# Patient Record
Sex: MALE | Race: WHITE | NOT HISPANIC OR LATINO | Employment: OTHER | URBAN - NONMETROPOLITAN AREA
[De-identification: names, ages, dates, MRNs, and addresses within clinical notes are randomized per-mention and may not be internally consistent; named-entity substitution may affect disease eponyms.]

---

## 2017-12-13 ENCOUNTER — OFFICE VISIT (OUTPATIENT)
Dept: CARDIOLOGY | Facility: CLINIC | Age: 67
End: 2017-12-13

## 2017-12-13 VITALS
DIASTOLIC BLOOD PRESSURE: 72 MMHG | HEART RATE: 88 BPM | SYSTOLIC BLOOD PRESSURE: 112 MMHG | OXYGEN SATURATION: 98 % | HEIGHT: 69 IN | BODY MASS INDEX: 36.29 KG/M2 | WEIGHT: 245 LBS

## 2017-12-13 DIAGNOSIS — R06.09 DYSPNEA ON EXERTION: ICD-10-CM

## 2017-12-13 DIAGNOSIS — E66.09 CLASS 2 OBESITY DUE TO EXCESS CALORIES WITHOUT SERIOUS COMORBIDITY WITH BODY MASS INDEX (BMI) OF 36.0 TO 36.9 IN ADULT: ICD-10-CM

## 2017-12-13 DIAGNOSIS — R94.31 ABNORMAL EKG: Primary | ICD-10-CM

## 2017-12-13 DIAGNOSIS — E78.5 DYSLIPIDEMIA: ICD-10-CM

## 2017-12-13 PROBLEM — E11.9 TYPE II DIABETES MELLITUS (HCC): Status: ACTIVE | Noted: 2017-12-13

## 2017-12-13 PROCEDURE — 93000 ELECTROCARDIOGRAM COMPLETE: CPT | Performed by: INTERNAL MEDICINE

## 2017-12-13 PROCEDURE — 99204 OFFICE O/P NEW MOD 45 MIN: CPT | Performed by: INTERNAL MEDICINE

## 2017-12-13 RX ORDER — DOCUSATE CALCIUM 240 MG
240 CAPSULE ORAL 2 TIMES DAILY
COMMUNITY

## 2017-12-13 RX ORDER — NAPROXEN SODIUM 220 MG
220 TABLET ORAL 2 TIMES DAILY PRN
COMMUNITY

## 2017-12-13 NOTE — PROGRESS NOTES
Reason for Visit: Abnormal EKG.    HPI:  Darren Salinas is a 67 y.o. male is being seen for consultation today at the request of Jess Rice.  He had an EKG done as part of a routine screening.  This was abnormal and he was referred to cardiology for further evaluation.  He notes some shortness of breath with exertion.  He denies any chest pain, palpitations, dizziness, syncope, PND, or orthopnea.      Previous Cardiac Testing and Procedures:  - EKG (10/12/2017) normal sinus rhythm with right bundle-branch block, left posterior fascicular block    Patient Active Problem List   Diagnosis   • Dyslipidemia   • Type II diabetes mellitus   • Class 2 obesity due to excess calories with body mass index (BMI) of 36.0 to 36.9 in adult       Social History   Substance Use Topics   • Smoking status: Never Smoker   • Smokeless tobacco: Never Used   • Alcohol use Yes      Comment: OCC        Family History   Problem Relation Age of Onset   • Heart disease Father        The following portions of the patient's history were reviewed and updated as appropriate: allergies, current medications, past family history, past medical history, past social history, past surgical history and problem list.      Current Outpatient Prescriptions:   •  aspirin 81 MG EC tablet, Take 81 mg by mouth Daily., Disp: , Rfl:   •  Canagliflozin (INVOKANA) 300 MG tablet, Take 300 mg by mouth., Disp: , Rfl:   •  docusate calcium (SURFAK) 240 MG capsule, Take 240 mg by mouth 2 (Two) Times a Day., Disp: , Rfl:   •  fenofibrate 160 MG tablet, Take 160 mg by mouth Daily., Disp: , Rfl:   •  metFORMIN (GLUCOPHAGE) 500 MG tablet, Take 500 mg by mouth 2 (Two) Times a Day With Meals., Disp: , Rfl:   •  naproxen sodium (ALEVE) 220 MG tablet, Take 220 mg by mouth 2 (Two) Times a Day As Needed., Disp: , Rfl:   •  rosuvastatin (CRESTOR) 20 MG tablet, Take 20 mg by mouth Daily., Disp: , Rfl:     Review of Systems   Constitution: Negative for chills, fever  "and weight loss.   HENT: Negative for sore throat.    Eyes: Negative for blurred vision and visual disturbance.   Cardiovascular: Negative for chest pain, dyspnea on exertion, leg swelling, palpitations, paroxysmal nocturnal dyspnea and syncope.   Respiratory: Negative for cough and shortness of breath.    Endocrine: Negative for cold intolerance and polyuria.   Skin: Negative for itching and rash.   Musculoskeletal: Negative for joint swelling and myalgias.   Gastrointestinal: Negative for abdominal pain, diarrhea, heartburn and vomiting.   Genitourinary: Negative for dysuria and hematuria.   Neurological: Negative for dizziness, headaches and numbness.   Psychiatric/Behavioral: Negative for depression. The patient is not nervous/anxious.    Allergic/Immunologic: Negative for hives.       Objective   /72 (BP Location: Left arm, Patient Position: Sitting, Cuff Size: Adult)  Pulse 88  Ht 175.3 cm (69\")  Wt 111 kg (245 lb)  SpO2 98%  BMI 36.18 kg/m2  Physical Exam   Constitutional: He is oriented to person, place, and time. He appears well-developed and well-nourished.   HENT:   Head: Normocephalic and atraumatic.   Eyes: Conjunctivae and EOM are normal. Pupils are equal, round, and reactive to light.   Neck: Normal range of motion. Neck supple. No JVD present. No thyromegaly present.   Cardiovascular: Normal rate, regular rhythm and normal heart sounds.    No murmur heard.  Pulmonary/Chest: Effort normal and breath sounds normal. He has no wheezes. He has no rales.   Abdominal: Soft. Bowel sounds are normal. He exhibits distension (obese). There is no tenderness.   Musculoskeletal: Normal range of motion. He exhibits no edema.   Neurological: He is alert and oriented to person, place, and time. Coordination normal.   Skin: Skin is warm and dry. No rash noted.   Psychiatric: He has a normal mood and affect. His behavior is normal.       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 Abnormal EKG R94.31 794.31   2. Dyspnea on exertion R06.09 786.09   3. Dyslipidemia E78.5 272.4   4. Class 2 obesity due to excess calories without serious comorbidity with body mass index (BMI) of 36.0 to 36.9 in adult E66.09 278.00    Z68.36 V85.36         Assessment/Plan:  1. Abnormal EKG: EKG from today demonstrates normal sinus rhythm with right bundle branch block and left anterior fascicular block.  Likely incidental finding.  Given his shortness of breath will check an echocardiogram to evaluate for any structural heart disease.    2.  Dyspnea on exertion: Likely due to deconditioning and obesity.  Will evaluate for any cardiac structural problem with an echocardiogram.    3.  Dyslipidemia: Managed on rosuvastatin and fenofibrate.  Lipid panel followed by Dr. Borrego.    4.  Obesity: BMI 36.   on diet, excise, weight loss.

## 2017-12-18 ENCOUNTER — OUTSIDE FACILITY SERVICE (OUTPATIENT)
Dept: CARDIOLOGY | Facility: CLINIC | Age: 67
End: 2017-12-18

## 2017-12-18 PROCEDURE — 93306 TTE W/DOPPLER COMPLETE: CPT | Performed by: INTERNAL MEDICINE

## 2017-12-22 ENCOUNTER — TELEPHONE (OUTPATIENT)
Dept: CARDIOLOGY | Facility: CLINIC | Age: 67
End: 2017-12-22

## 2017-12-22 NOTE — TELEPHONE ENCOUNTER
----- Message from Walker Lacy MD sent at 12/22/2017 11:51 AM CST -----  Please let him know that his echocardiogram demonstrated normal cardiac structure and function.